# Patient Record
Sex: FEMALE | Race: OTHER | HISPANIC OR LATINO | URBAN - METROPOLITAN AREA
[De-identification: names, ages, dates, MRNs, and addresses within clinical notes are randomized per-mention and may not be internally consistent; named-entity substitution may affect disease eponyms.]

---
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
## 2019-03-09 ENCOUNTER — TRANSCRIPTION ENCOUNTER (OUTPATIENT)
Age: 19
End: 2019-03-09

## 2019-04-26 ENCOUNTER — INPATIENT (INPATIENT)
Facility: HOSPITAL | Age: 19
LOS: 3 days | Discharge: HOME | End: 2019-04-30
Attending: PSYCHIATRY & NEUROLOGY | Admitting: PSYCHIATRY & NEUROLOGY
Payer: COMMERCIAL

## 2019-04-26 VITALS
TEMPERATURE: 100 F | RESPIRATION RATE: 18 BRPM | SYSTOLIC BLOOD PRESSURE: 117 MMHG | DIASTOLIC BLOOD PRESSURE: 73 MMHG | OXYGEN SATURATION: 100 % | WEIGHT: 88.18 LBS | HEART RATE: 96 BPM

## 2019-04-26 DIAGNOSIS — F33.9 MAJOR DEPRESSIVE DISORDER, RECURRENT, UNSPECIFIED: ICD-10-CM

## 2019-04-26 DIAGNOSIS — F41.9 ANXIETY DISORDER, UNSPECIFIED: ICD-10-CM

## 2019-04-26 DIAGNOSIS — F32.9 MAJOR DEPRESSIVE DISORDER, SINGLE EPISODE, UNSPECIFIED: ICD-10-CM

## 2019-04-26 DIAGNOSIS — F12.10 CANNABIS ABUSE, UNCOMPLICATED: ICD-10-CM

## 2019-04-26 DIAGNOSIS — R45.851 SUICIDAL IDEATIONS: ICD-10-CM

## 2019-04-26 DIAGNOSIS — F33.1 MAJOR DEPRESSIVE DISORDER, RECURRENT, MODERATE: ICD-10-CM

## 2019-04-26 LAB
ALBUMIN SERPL ELPH-MCNC: 4.3 G/DL — SIGNIFICANT CHANGE UP (ref 3.5–5.2)
ALP SERPL-CCNC: 49 U/L — SIGNIFICANT CHANGE UP (ref 30–115)
ALT FLD-CCNC: 7 U/L — LOW (ref 14–37)
ANION GAP SERPL CALC-SCNC: 10 MMOL/L — SIGNIFICANT CHANGE UP (ref 7–14)
APAP SERPL-MCNC: <5 UG/ML — LOW (ref 10–30)
AST SERPL-CCNC: 14 U/L — SIGNIFICANT CHANGE UP (ref 14–37)
BASOPHILS # BLD AUTO: 0.05 K/UL — SIGNIFICANT CHANGE UP (ref 0–0.2)
BASOPHILS NFR BLD AUTO: 0.9 % — SIGNIFICANT CHANGE UP (ref 0–1)
BILIRUB SERPL-MCNC: 0.2 MG/DL — SIGNIFICANT CHANGE UP (ref 0.2–1.2)
BUN SERPL-MCNC: 8 MG/DL — LOW (ref 10–20)
CALCIUM SERPL-MCNC: 9 MG/DL — SIGNIFICANT CHANGE UP (ref 8.5–10.1)
CHLORIDE SERPL-SCNC: 102 MMOL/L — SIGNIFICANT CHANGE UP (ref 98–110)
CO2 SERPL-SCNC: 27 MMOL/L — SIGNIFICANT CHANGE UP (ref 17–32)
CREAT SERPL-MCNC: 0.5 MG/DL — LOW (ref 0.7–1.5)
EOSINOPHIL # BLD AUTO: 0.09 K/UL — SIGNIFICANT CHANGE UP (ref 0–0.7)
EOSINOPHIL NFR BLD AUTO: 1.5 % — SIGNIFICANT CHANGE UP (ref 0–8)
ETHANOL SERPL-MCNC: <10 MG/DL — SIGNIFICANT CHANGE UP
GLUCOSE SERPL-MCNC: 79 MG/DL — SIGNIFICANT CHANGE UP (ref 70–99)
HCT VFR BLD CALC: 36.2 % — LOW (ref 37–47)
HGB BLD-MCNC: 12 G/DL — SIGNIFICANT CHANGE UP (ref 12–16)
IMM GRANULOCYTES NFR BLD AUTO: 0.2 % — SIGNIFICANT CHANGE UP (ref 0.1–0.3)
LYMPHOCYTES # BLD AUTO: 2.36 K/UL — SIGNIFICANT CHANGE UP (ref 1.2–3.4)
LYMPHOCYTES # BLD AUTO: 40.5 % — SIGNIFICANT CHANGE UP (ref 20.5–51.1)
MCHC RBC-ENTMCNC: 30 PG — SIGNIFICANT CHANGE UP (ref 27–31)
MCHC RBC-ENTMCNC: 33.1 G/DL — SIGNIFICANT CHANGE UP (ref 32–37)
MCV RBC AUTO: 90.5 FL — SIGNIFICANT CHANGE UP (ref 81–99)
MONOCYTES # BLD AUTO: 0.39 K/UL — SIGNIFICANT CHANGE UP (ref 0.1–0.6)
MONOCYTES NFR BLD AUTO: 6.7 % — SIGNIFICANT CHANGE UP (ref 1.7–9.3)
NEUTROPHILS # BLD AUTO: 2.92 K/UL — SIGNIFICANT CHANGE UP (ref 1.4–6.5)
NEUTROPHILS NFR BLD AUTO: 50.2 % — SIGNIFICANT CHANGE UP (ref 42.2–75.2)
NRBC # BLD: 0 /100 WBCS — SIGNIFICANT CHANGE UP (ref 0–0)
PLATELET # BLD AUTO: 214 K/UL — SIGNIFICANT CHANGE UP (ref 130–400)
POTASSIUM SERPL-MCNC: 4.1 MMOL/L — SIGNIFICANT CHANGE UP (ref 3.5–5)
POTASSIUM SERPL-SCNC: 4.1 MMOL/L — SIGNIFICANT CHANGE UP (ref 3.5–5)
PROT SERPL-MCNC: 6.8 G/DL — SIGNIFICANT CHANGE UP (ref 6–8)
RBC # BLD: 4 M/UL — LOW (ref 4.2–5.4)
RBC # FLD: 12 % — SIGNIFICANT CHANGE UP (ref 11.5–14.5)
SALICYLATES SERPL-MCNC: <0.3 MG/DL — LOW (ref 4–30)
SODIUM SERPL-SCNC: 139 MMOL/L — SIGNIFICANT CHANGE UP (ref 135–146)
WBC # BLD: 5.82 K/UL — SIGNIFICANT CHANGE UP (ref 4.8–10.8)
WBC # FLD AUTO: 5.82 K/UL — SIGNIFICANT CHANGE UP (ref 4.8–10.8)

## 2019-04-26 PROCEDURE — 99285 EMERGENCY DEPT VISIT HI MDM: CPT | Mod: 25

## 2019-04-26 PROCEDURE — 93010 ELECTROCARDIOGRAM REPORT: CPT

## 2019-04-26 PROCEDURE — 90792 PSYCH DIAG EVAL W/MED SRVCS: CPT | Mod: GT

## 2019-04-26 RX ORDER — MIRTAZAPINE 45 MG/1
15 TABLET, ORALLY DISINTEGRATING ORAL AT BEDTIME
Qty: 0 | Refills: 0 | Status: DISCONTINUED | OUTPATIENT
Start: 2019-04-26 | End: 2019-04-30

## 2019-04-26 RX ORDER — NICOTINE POLACRILEX 2 MG
2 GUM BUCCAL
Qty: 0 | Refills: 0 | Status: DISCONTINUED | OUTPATIENT
Start: 2019-04-26 | End: 2019-04-30

## 2019-04-26 RX ORDER — SERTRALINE 25 MG/1
50 TABLET, FILM COATED ORAL DAILY
Qty: 0 | Refills: 0 | Status: DISCONTINUED | OUTPATIENT
Start: 2019-04-26 | End: 2019-04-26

## 2019-04-26 RX ORDER — ACETAMINOPHEN 500 MG
650 TABLET ORAL EVERY 6 HOURS
Qty: 0 | Refills: 0 | Status: DISCONTINUED | OUTPATIENT
Start: 2019-04-26 | End: 2019-04-30

## 2019-04-26 RX ORDER — HYDROXYZINE HCL 10 MG
25 TABLET ORAL EVERY 8 HOURS
Qty: 0 | Refills: 0 | Status: DISCONTINUED | OUTPATIENT
Start: 2019-04-26 | End: 2019-04-30

## 2019-04-26 RX ADMIN — SERTRALINE 50 MILLIGRAM(S): 25 TABLET, FILM COATED ORAL at 09:42

## 2019-04-26 RX ADMIN — MIRTAZAPINE 15 MILLIGRAM(S): 45 TABLET, ORALLY DISINTEGRATING ORAL at 20:38

## 2019-04-26 NOTE — ED BEHAVIORAL HEALTH ASSESSMENT NOTE - DESCRIPTION
Denies since parents  10 years ago, she has lived with her mother and older brother, the latter lives separately now with his own family. ED Course:    Patient in ED for ~ 30 minutes prior to Telepsych consult which was requested at 1:04. Per resident, Dr. Modi patient arrived at ~0:33 dressed appropriately for the weather, with good hygiene/grooming, and accompanied by her cousin. Resident states that upon arrival patient reports she has been feeling depressed and anxious with suicidal ideations; but denies plan and intent. Resident states that upon arrival patient was compliant with the triage/interview process, willingly changed into a hospital gown, allowed security to wand her and collect her belongings without incident.  Nothing of note in patient’s belongings. Resident states that because of patients age, blood work and a urinalysis has not been ordered. However, telepsych attending requested blood work and a urinalysis shortly after assessment. Patient’s speech is at a steady/normal rate and is clear/audible with a linear thought content and good eye content. Per resident,  patient appears euthymic with a mood congruent affect. Per resident, patient denies AH/VH, delusions is A/OX3 and does not appear cognitively impaired. Patient has not slept or eaten anything as of yet. Resident states that patient has been calm/cooperative in the ED, no agitation, aggression or behavioral issues. Patient is engaging with staff appropriately. Patient has not required additional psychiatric medications or security interventions. Patient was placed on 1:1 observation and has been interacting appropriately with the 1:1. Patient placed in private room for telepsychiatry interview that began at 1:19. No additional complaints at this time.

## 2019-04-26 NOTE — ED PROVIDER NOTE - OBJECTIVE STATEMENT
18yo f pmhx anxiety, depression, suicidal ideation presents CC  anxiety, depression, states it would just be easier if she wasn't here anymore. pt denies having a plan to end her life. no homicidal ideation. pt denies ingestion pta. 1:1 placed upon arrival, tele psych consulted, spoke to dr pulido. reports occasional xanax use, last used 2 months ago. reports occasional marijuana use but states not recently.

## 2019-04-26 NOTE — ED BEHAVIORAL HEALTH ASSESSMENT NOTE - OTHER PAST PSYCHIATRIC HISTORY (INCLUDE DETAILS REGARDING ONSET, COURSE OF ILLNESS, INPATIENT/OUTPATIENT TREATMENT)
1 prior IPP admission in 03/2018. subsequent PHP.   She had a suicide attempt by hanging in 2017, and a recent suicide attempt of overdosing on xanax+ alcohol in 03/2018 which resulting in her going to the [facility] ED, from which she was discharged.  Patient self stopped taking Remeron and seeing outpatient provider 2-3 months after last discharged from [facility]

## 2019-04-26 NOTE — H&P ADULT - ASSESSMENT
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     Patient is seen and evaluated using telehealth device, she is calm and cooperative, states that she has been feeling overwhelmed with worsen depressed mood, anxiety, and frequent passive SI for the past 1 month.  she states that she has been struggling at school, recently broke up with girlfriend 2 weeks ago, and her appetite was decreased since 1 month ago, feels nauseous whenever she eats.  She reports feeling depressed and anxious on most days with poor sleep, energy, feeling worthless and hopeless, with passive SI almost every other day, but denied active SI/plan/intent.  She states she has lost 7 lbs since last month, continues to have low motivation, unable to engaged in school or social agenda.  She denied HI/AVH at this time.  She shares that since her psych admission last March, she completed PHP and was prescribed with Remeron and was supposed to follow up with psychiatrist and therapist.  However patient admits to stopped taking Remeron 2-3 months after she was discharged as she started to feel better and did not followup with outpatient providers as "they asked too many questions." and she verbalized wanting to "just talk about my problems and vent."  She also started drinking Ensure as she has not been eating, above was recommended last year during her psych admission for decreased po intake due to depression.  Patient agreed with voluntary psych admission at this time for stabilization.

## 2019-04-26 NOTE — ED BEHAVIORAL HEALTH ASSESSMENT NOTE - RISK ASSESSMENT
Acute Suicide Risk  ( X ) High   (  ) Moderate   (  ) Low   (  ) Unable to determine   Rationale:  see assessment for details.      Elevated Chronic Risk   ( X ) Yes.  Due to substance use, psychiatric history, psychological factors, h/o SA, noncompliance   Details ___________  (  ) No   ___________     Safety Plan   Details: ___________  [ X ] Safety plan discussed with patient.  Inform staff/doctor when endorsing SI/HI/AVH, currently feels safe in hospital environment   [ X ] Education provided regarding environmental safety / lethal means restriction  [  ] Provision of National Suicide Prevention Lifeline 3-724-491-XSAD (1870)

## 2019-04-26 NOTE — ED BEHAVIORAL HEALTH ASSESSMENT NOTE - SUICIDE PROTECTIVE FACTORS
Engaged in work or school/Responsibility to family and others/Supportive social network or family/Identifies reasons for living/Future oriented/Positive therapeutic relationships

## 2019-04-26 NOTE — PATIENT PROFILE BEHAVIORAL HEALTH - PMH
Recurrent major depressive disorder, remission status unspecified  plan as per psychiatrist   medication as per PMD

## 2019-04-26 NOTE — ED BEHAVIORAL HEALTH ASSESSMENT NOTE - SUMMARY
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 Patient has substantial neurovegetative symptoms including poor sleep, appetite, weight loss, poor energy, low motivation, anhedonia, increased anxiety, frequent passive SI, feeling hopeless, and worthless with acute stressors of recent breakup and stressful academic affairs.  Patient is currently at elevated risk given above plus h/o SI, SA, impulsive behavior, substance use, treatment noncompliance, meeting criteria for inpatient admission for stabilization.

## 2019-04-26 NOTE — CHART NOTE - NSCHARTNOTEFT_GEN_A_CORE
Social Work Admit Note:    Patient is 19 years of age female who was admitted for evaluation of worsening depression and suicidal ideation.  She presented to the hospital with her cousin.  At time of assessment in the emergency department she endorsed depressed mood, anxiety, and for a period of one month passive and frequent suicidal ideation.  Stressors identified as recent breakup with girlfriend and school.  Decreased appetite, poor sleep and feeling worthless and hopeless also endorsed.      In the community patient resides with her mother and brother in a private residence in [place].  She is single and an undergraduate student.  Family history of substance use with her father who was incarcerated when patient was born until she was [age] years of age.  Patient’s parents are  from ten years ago.      Patient has history of one prior inpatient psychiatric admission in March of 2018.  She also has history suicide attempts by hanging in 2017 and overdose of alcohol and xanax in 2018.  Patient has [facility] and then outpatient mental health.  She discontinued medication and outpatient treatment as she was feeling better.          will continue to meet with patient 1:1 and with treatment team daily.  Discharge plan is for referral for continued treatment in outpatient setting.     Please refer to Social Work Psychosocial for additional information.

## 2019-04-26 NOTE — PROGRESS NOTE BEHAVIORAL HEALTH - NSBHADDHXPSYCHFT_PSY_A_CORE
1 prior IPP admission in 03/2018. subsequent PHP.   She had a suicide attempt by hanging in 2017, and a suicide attempt of overdosing on xanax+ alcohol in 03/2018 which resulting in her going to the [facility] ED, from which she was discharged.  Patient self stopped taking Remeron and seeing outpatient provider 2-3 months after last discharged from [facility]

## 2019-04-26 NOTE — ED BEHAVIORAL HEALTH ASSESSMENT NOTE - DETAILS
she had a suicide attempt by hanging in 2017, and a recent suicide attempt of overdosing on xanax in 03/2018 which resulting in her going to the [facility] ED, from which she was discharged. father used substance and was in [employer] for two years when pt was born tired nausea handoff provided to inpatient psychiatrist Patient and ED notified

## 2019-04-26 NOTE — ED BEHAVIORAL HEALTH ASSESSMENT NOTE - HPI (INCLUDE ILLNESS QUALITY, SEVERITY, DURATION, TIMING, CONTEXT, MODIFYING FACTORS, ASSOCIATED SIGNS AND SYMPTOMS)
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 in March 2019 and PHP subsequently, with xanax use disorder (in remission), marijuana use disorder, 2 prior SA/gestures by hanging in 2017 and xanax overdose in March 2018, not currently on any psychiatric medication or outpatient treatment, presented with cousin tonight for depression and SI.     Patient is seen and evaluated using telehealth device, she is calm and cooperative, states that she has been feeling overwhelmed with worsen depressed mood, anxiety, and frequent passive SI for the past 1 month.  she states that she has been struggling at school, recently broke up with girlfriend 2 weeks ago, and her appetite was decreased since 1 month ago, feels nauseous whenever she eats.  She reports feeling depressed and anxious on most days with poor sleep, energy, feeling worthless and hopeless, with passive SI almost every other day, but denied active SI/plan/intent.  She states she has lost 7 lbs since last month, continues to have low motivation, unable to engaged in school or social agenda.  She denied HI/AVH at this time.  She shares that since her psych admission last March, she completed PHP and was prescribed with Remeron and was supposed to follow up with psychiatrist and therapist.  However patient admits to stopped taking Remeron 2-3 months after she was discharged as she started to feel better and did not followup with outpatient providers as "they asked too many questions." and she verbalized wanting to "just talk about my problems and vent."  She also started drinking Ensure as she has not been eating, above was recommended last year during her psych admission for decreased po intake due to depression.  Patient agreed with voluntary psych admission at this time for stabilization.    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     Patient is seen and evaluated using telehealth device, she is calm and cooperative, states that she has been feeling overwhelmed with worsen depressed mood, anxiety, and frequent passive SI for the past 1 month.  she states that she has been struggling at school, recently broke up with girlfriend 2 weeks ago, and her appetite was decreased since 1 month ago, feels nauseous whenever she eats.  She reports feeling depressed and anxious on most days with poor sleep, energy, feeling worthless and hopeless, with passive SI almost every other day, but denied active SI/plan/intent.  She states she has lost 7 lbs since last month, continues to have low motivation, unable to engaged in school or social agenda.  She denied HI/AVH at this time.  She shares that since her psych admission last March, she completed PHP and was prescribed with Remeron and was supposed to follow up with psychiatrist and therapist.  However patient admits to stopped taking Remeron 2-3 months after she was discharged as she started to feel better and did not followup with outpatient providers as "they asked too many questions." and she verbalized wanting to "just talk about my problems and vent."  She also started drinking Ensure as she has not been eating, above was recommended last year during her psych admission for decreased po intake due to depression.  Patient agreed with voluntary psych admission at this time for stabilization.    Collateral information is obtained by [facility]:  Collateral provided by cousin Abebe Javier [phone], daily contact and reliability is high. Per cousin the HPI starts yesterday evening. Prior to that patient was functioning at her baseline which consisted of eating about 2-3 meals per day, completing her IADL’s, obtaining about 7-8 hours of sleep, socializing with peers, attending [employer] 3-4 times per week and goes to work at [employer] about 3-4 times week. Baseline symptoms include mild anxiety, euthymic mood and clear/linear thoughts. Patient is not prescribed medications and does not have outpatient mental health treatment.     Per cousin, patient was hospitalized at [facility] last March for depression with suicidal ideations. Cousin states that patient was discharged with the recommendation to follow up at [facility]. Per cousin patient went to treatment for a few weeks and stopped going because she felt better. Cousin reports since her discharge in March (2018) patient has been functioning at her baseline. Cousin reports, patient broke up with her girlfriend about one month ago. He states the relationship was somewhat “toxic” and “volatile”.  Per Cousin, since the breakup patient has been decompensating. Cousin states, yesterday evening patient had a breaking point and verbalized suicidal ideations with a plan to jump off of a bridge. At that point, cousin decided to bring patient to the ED.    Patients decompensation trigger is identified as a recent break-up resulting in a depressed mood with suicidal ideations and increased anxiety. Patients decompensation functioning consists of sleeping less; about 3-4 hours per night, supplementing her meals with 2-3 cans of Ensure a day; per cousin patient lost about 7 pounds over the last 30 days and is isolating in the home. Though she has been depressed, cousin reports patient has been going to work and attending school regularly. Patients decompensation symptoms consists of bouts of crying episodes, a depressed mood, increased anxiety and a distractible thought content.     Per cousin, patient was diagnosed with Depression a few years ago. She has a history of THC and Xanax abuse. Per cousin, patient has no legal, , trauma or abuse history. There is a family history of Bipolar disorder; but no substance abuse. There is no family history of completed suicide or attempts. Patient has no history of violence or self-injurious behaviors. Patient had a normal developmental pattern and no medical issues. She has no access to weapons, guns or knives. Cousin has significant safety concerns and believes patient would benefit from a psychiatric hospitalization. When patient is discharged from the hospital, cousin is able to provide care/support to ensure patient is compliant with recommended treatment.

## 2019-04-26 NOTE — ED BEHAVIORAL HEALTH ASSESSMENT NOTE - PSYCHIATRIC ISSUES AND PLAN (INCLUDE STANDING AND PRN MEDICATION)
Start Remeron 7.5mg po qhs, trazodone 50mg qhs PRN for insomnia, Haldol 2mg/Benadryl 50mg q6H prn for agitation

## 2019-04-26 NOTE — H&P ADULT - NSICDXPASTMEDICALHX_GEN_ALL_CORE_FT
PAST MEDICAL HISTORY:  Recurrent major depressive disorder, remission status unspecified plan as per psychiatrist   medication as per PMD

## 2019-04-26 NOTE — PROGRESS NOTE BEHAVIORAL HEALTH - NSBHADDHXSUBSTFT_PSY_A_CORE
Reports daily marijuana use of 1-3 blunts per day. States she has a history of using Xanax, last use 2 weeks ago.   Denied other drug/alcohol use.
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
## 2019-04-26 NOTE — ED PROVIDER NOTE - PHYSICAL EXAMINATION
CONSTITUTIONAL: Well-developed; well-nourished; in no acute distress, speaking in full sentences  SKIN: warm, dry  HEAD: Normocephalic; atraumatic  EYES: PERRL, EOMI, no conjunctival erythema  ENT: No nasal discharge; airway clear, mucous membranes moist  NECK: Supple; non tender, FROM  CARD: +S1, S2 no murmurs, gallops, or rubs. Regular rate and rhythm. radial 2+  RESP: No wheezes, rales or rhonchi. CTABL  EXT: moves all extremities,  No clubbing, cyanosis or edema.   NEURO: Alert, oriented, grossly unremarkable, no focal deficits, cn ii-xii grossly intact  PSYCH: Cooperative, appropriate, denies homicidal ideation.

## 2019-04-26 NOTE — ED PROVIDER NOTE - ATTENDING CONTRIBUTION TO CARE
I personally evaluated the patient. I reviewed the Resident’s or Physician Assistant’s note (as assigned above), and agree with the findings and plan except as documented in my note.  19 yr F, hx of anxiety and depression, non complaint with her medical treatment presents with worsened depression. Denies active suicidal planning. No medical complaints except from chronic lack of appetite. Denies any fever, coughing, SOB. Pt reports using vape and smoking marjuana, occasional recreational Xanax use. VS reviewed, pt well appearing, NAD. Head ncat, pharyngeal exam w/o erythema, edema or exudates. B/l TM wnl. normal s1s2 without any murmurs, Lungs CTAB with normal work of breathing. abd +BS, s/nd/nt, extremities wnl, neuro exam grossly normal. No acute skin rashes. [facility]

## 2019-04-26 NOTE — PROGRESS NOTE BEHAVIORAL HEALTH - NSBHCHARTREVIEWLAB_PSY_A_CORE FT
Comprehensive Metabolic Panel (04.26.19 @ 02:07)    Sodium, Serum: 139 mmol/L    Potassium, Serum: 4.1 mmol/L    Chloride, Serum: 102 mmol/L    Carbon Dioxide, Serum: 27 mmol/L    Anion Gap, Serum: 10 mmol/L    Blood Urea Nitrogen, Serum: 8 mg/dL    Creatinine, Serum: 0.5 mg/dL    Glucose, Serum: 79 mg/dL    Calcium, Total Serum: 9.0 mg/dL    Protein Total, Serum: 6.8 g/dL    Albumin, Serum: 4.3 g/dL    Bilirubin Total, Serum: 0.2 mg/dL    Alkaline Phosphatase, Serum: 49 U/L    Aspartate Aminotransferase (AST/SGOT): 14 U/L    Alanine Aminotransferase (ALT/SGPT): 7 U/L    eGFR if Non : 139: Interpretative comment    Complete Blood Count + Automated Diff (04.26.19 @ 02:07)    WBC Count: 5.82 K/uL    RBC Count: 4.00 M/uL    Hemoglobin: 12.0 g/dL    Hematocrit: 36.2 %    Mean Cell Volume: 90.5 fL    Mean Cell Hemoglobin: 30.0 pg    Mean Cell Hemoglobin Conc: 33.1 g/dL    Red Cell Distrib Width: 12.0 %    Platelet Count - Automated: 214 K/uL    Auto Neutrophil #: 2.92 K/uL    Auto Lymphocyte #: 2.36 K/uL    Auto Monocyte #: 0.39 K/uL    Auto Eosinophil #: 0.09 K/uL    Auto Basophil #: 0.05 K/uL    Auto Neutrophil %: 50.2: Differential percentages must be correlated with absolute numbers for  clinical significance. %    Auto Lymphocyte %: 40.5 %    Auto Monocyte %: 6.7 %    Auto Eosinophil %: 1.5 %    Auto Basophil %: 0.9 %    Auto Immature Granulocyte %: 0.2 %    Nucleated RBC: 0 /100 WBCs

## 2019-04-26 NOTE — ED PROVIDER NOTE - CLINICAL SUMMARY MEDICAL DECISION MAKING FREE TEXT BOX
Pt with hx of depression presents with worsened depression. Admitted to psych for further management.

## 2019-04-26 NOTE — ED PROVIDER NOTE - NS ED ROS FT
Constitutional: (-) fever (-) vomiting  Eyes/ENT: (-) blurry vision, (-) epistaxis  Cardiovascular: (-) chest pain, (-) syncope  Respiratory: (-) cough, (-) shortness of breath  Gastrointestinal: (-) vomiting, (-) diarrhea, (-) abdominal pain  : (-) dysuria   Musculoskeletal: (-) neck pain, (-) back pain, (-) joint pain  Integumentary: (-) rash, (-) edema  Neurological: (-) headache, (-)loc  Allergic/Immunologic: (-) pruritus  Endocrine: No history of thyroid disease or diabetes.

## 2019-04-26 NOTE — ED BEHAVIORAL HEALTH ASSESSMENT NOTE - DESCRIPTION (FIRST USE, LAST USE, QUANTITY, FREQUENCY, DURATION)
social alcohol use none since d/c from [facility] daily use 1-3 blunts patient has h/o Xanax abuse but denied current use

## 2019-04-26 NOTE — ED PEDIATRIC TRIAGE NOTE - CHIEF COMPLAINT QUOTE
Pt came c/o thoughts of suicide, c/o depression, not eating and drinking well for the past 2 months, lost about 7 pounds

## 2019-04-26 NOTE — CONSULT NOTE ADULT - SUBJECTIVE AND OBJECTIVE BOX
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     Patient is seen and evaluated using telehealth device, she is calm and cooperative, states that she has been feeling overwhelmed with worsen depressed mood, anxiety, and frequent passive SI for the past 1 month.  she states that she has been struggling at school, recently broke up with girlfriend 2 weeks ago, and her appetite was decreased since 1 month ago, feels nauseous whenever she eats.  She reports feeling depressed and anxious on most days with poor sleep, energy, feeling worthless and hopeless, with passive SI almost every other day, but denied active SI/plan/intent.  She states she has lost 7 lbs since last month, continues to have low motivation, unable to engaged in school or social agenda.  She denied HI/AVH at this time.  She shares that since her psych admission last March, she completed PHP and was prescribed with Remeron and was supposed to follow up with psychiatrist and therapist.  However patient admits to stopped taking Remeron 2-3 months after she was discharged as she started to feel better and did not followup with outpatient providers as "they asked too many questions." and she verbalized wanting to "just talk about my problems and vent."  She also started drinking Ensure as she has not been eating, above was recommended last year during her psych admission for decreased po intake due to depression.  Patient agreed with voluntary psych admission at this time for stabilization.    Collateral information is obtained by [facility]:  Collateral provided by coumacy Abebe Javier [phone], daily contact and reliability is high. Per cousin the HPI starts yesterday evening. Prior to that patient was functioning at her baseline which consisted of eating about 2-3 meals per day, completing her IADL’s, obtaining about 7-8 hours of sleep, socializing with peers, attending [employer] 3-4 times per week and goes to work at [employer] about 3-4 times week. Baseline symptoms include mild anxiety, euthymic mood and clear/linear thoughts. Patient is not prescribed medications and does not have outpatient mental health treatment. (26 Apr 2019 06:34)    INTERVAL HPI/OVERNIGHT EVENTS:  HEALTH ISSUES - PROBLEM Dx:  Anxiety: Anxiety  Depression: Depression  Suicidal ideation: Suicidal ideation  Cannabis abuse  Major depressive disorder, recurrent episode with anxious distress        PAST MEDICAL & SURGICAL HISTORY:  Recurrent major depressive disorder, remission status unspecified: plan as per psychiatrist   medication as per PMD  No significant past surgical history    FAMILY HISTORY:    acetaminophen   Tablet .. 650 milliGRAM(s) Oral every 6 hours PRN  aluminum hydroxide/magnesium hydroxide/simethicone Suspension 30 milliLiter(s) Oral every 4 hours PRN  nicotine  Polacrilex Gum 2 milliGRAM(s) Oral every 2 hours PRN  sertraline 50 milliGRAM(s) Oral daily      REVIEW OF SYSTEMS:  CONSTITUTIONAL: No fever, weight loss, or fatigue  EYES: No eye pain, visual disturbances, or discharge  ENMT:  No difficulty hearing, tinnitus, vertigo; No sinus or throat pain  NECK: No pain or stiffness  BREASTS: No pain, masses, or nipple discharge  RESPIRATORY: No cough, wheezing, chills or hemoptysis; No shortness of breath  CARDIOVASCULAR: No chest pain, palpitations, dizziness, or leg swelling  GASTROINTESTINAL: No abdominal or epigastric pain. No nausea, vomiting, or hematemesis; No diarrhea or constipation. No melena or hematochezia.  GENITOURINARY: No dysuria, frequency, hematuria, or incontinence  NEUROLOGICAL: No headaches, memory loss, loss of strength, numbness, or tremors  SKIN: No itching, burning, rashes, or lesions   LYMPH NODES: No enlarged glands  ENDOCRINE: No heat or cold intolerance; No hair loss  MUSCULOSKELETAL: No joint pain or swelling; No muscle, back, or extremity pain  PSYCHIATRIC: as per hpi and previous psych history  HEME/LYMPH: No easy bruising, or bleeding gums  ALLERY AND IMMUNOLOGIC: No hives or eczema    T(C): 36.1 (04-26-19 @ 09:31), Max: 37.6 (04-26-19 @ 00:47)  HR: 61 (04-26-19 @ 09:31) (61 - 96)  BP: 86/49 (04-26-19 @ 09:31) (86/49 - 117/73)  RR: 18 (04-26-19 @ 09:31) (16 - 18)  SpO2: 100% (04-26-19 @ 00:47) (100% - 100%)  Wt(kg): --Vital Signs Last 24 Hrs  T(C): 36.1 (26 Apr 2019 09:31), Max: 37.6 (26 Apr 2019 00:47)  T(F): 96.9 (26 Apr 2019 09:31), Max: 99.6 (26 Apr 2019 00:47)  HR: 61 (26 Apr 2019 09:31) (61 - 96)  BP: 86/49 (26 Apr 2019 09:31) (86/49 - 117/73)  BP(mean): --  RR: 18 (26 Apr 2019 09:31) (16 - 18)  SpO2: 100% (26 Apr 2019 00:47) (100% - 100%)    PHYSICAL EXAM:  GENERAL: NAD,well-developed  HEAD:  Atraumatic, Normocephalic  EYES: EOMI, PERRLA, conjunctiva and sclera clear  ENMT: No tonsillar erythema, exudates, or enlargement; Moist mucous membranes, Good dentition, No lesions  NECK: Supple, No JVD, Normal thyroid  CHEST/LUNG: Clear bs bilaterally; No rales, rhonchi, wheezing  HEART: Regular rate and rhythm; No murmurs, rubs, or gallops  ABDOMEN: Soft, Nontender, Nondistended; Bowel sounds present  EXTREMITIES:  2+ Peripheral Pulses, No clubbing, cyanosis, or edema  LYMPH: No lymphadenopathy noted  SKIN: No rashes or lesions  Neuro: alert  no focal deficits    Consultant(s) Notes Reviewed:  [x ] YES  [ ] NO  Care Discussed with Consultants/Other Providers [ x] YES  [ ] NO    LABS:                        12.0   5.82  )-----------( 214      ( 26 Apr 2019 02:07 )             36.2     04-26    139  |  102  |  8<L>  ----------------------------<  79  4.1   |  27  |  0.5<L>    Ca    9.0      26 Apr 2019 02:07    TPro  6.8  /  Alb  4.3  /  TBili  0.2  /  DBili  x   /  AST  14  /  ALT  7<L>  /  AlkPhos  49  04-26        CAPILLARY BLOOD GLUCOSE                RADIOLOGY & ADDITIONAL TESTS:    Imaging Personally Reviewed:  [ ] YES  [ ] NO

## 2019-04-26 NOTE — ED PROVIDER NOTE - PROGRESS NOTE DETAILS
dr bolaños discussed case with dr. campbell from tele psych, will order labs and admit to [facility] psych. dr bolaños discussed case with dr. campbell from tele psych, will order labs and [facility] to [facility]. [facility] voluntary admission form completed, faxed to tele psych, placed in chart ZW

## 2019-04-26 NOTE — PATIENT PROFILE BEHAVIORAL HEALTH - NS SC CAGE ALCOHOL GUILTY ABOUT
Pt with symptoms consistent w/ CHF exacerbation including progressive SOB/RODRIGUEZ/ b/l LE swelling,  (lower d/t obesity), although CXR not showing any pleural effusions, just cardiomegaly/increased lung markings. CTAngio showing unchanged large pericardial effusion as possible worsening cause of HF. Trigger is multifactorial including medication/diet noncompliance. Less likely infection being that no signs/symptoms of infection & afebrile, no leukocytosis, or Ischemia being that no sx of chest pain, Trops neg2x; Last cath in 2016 no evidence of ischemia. Pt on Home [id number], Norvasc2.5q, [id number], Atorvastatin 80qd )  -Diuresis: IV Lasix 80 2x given; Lasix AM given; c/w monitoring   -C/w [id number], Norvasc2.5 q, Atorvastatin 80qd   -Echo   -Daily weight & strict I/Os no

## 2019-04-26 NOTE — H&P ADULT - NSHPLABSRESULTS_GEN_ALL_CORE
Vital Signs Last 24 Hrs  T(C): 35.4 (26 Apr 2019 06:30), Max: 37.6 (26 Apr 2019 00:47)  T(F): 95.8 (26 Apr 2019 06:30), Max: 99.6 (26 Apr 2019 00:47)  HR: 70 (26 Apr 2019 06:30) (70 - 96)  BP: 109/58 (26 Apr 2019 06:30) (100/50 - 117/73)  BP(mean): --  RR: 16 (26 Apr 2019 06:30) (16 - 18)  SpO2: 100% (26 Apr 2019 00:47) (100% - 100%)                        12.0   5.82  )-----------( 214      ( 26 Apr 2019 02:07 )             36.2       04-26    139  |  102  |  8<L>  ----------------------------<  79  4.1   |  27  |  0.5<L>    Ca    9.0      26 Apr 2019 02:07    TPro  6.8  /  Alb  4.3  /  TBili  0.2  /  DBili  x   /  AST  14  /  ALT  7<L>  /  AlkPhos  49  04-26                      Lactate Trend            CAPILLARY BLOOD GLUCOSE

## 2019-04-27 LAB
CHOLEST SERPL-MCNC: 104 MG/DL — SIGNIFICANT CHANGE UP (ref 95–200)
ESTIMATED AVERAGE GLUCOSE: 97 MG/DL — SIGNIFICANT CHANGE UP (ref 68–114)
HBA1C BLD-MCNC: 5 % — SIGNIFICANT CHANGE UP (ref 4–5.6)
HDLC SERPL-MCNC: 51 MG/DL — SIGNIFICANT CHANGE UP
LIPID PNL WITH DIRECT LDL SERPL: 54 MG/DL — SIGNIFICANT CHANGE UP (ref 4–129)
TOTAL CHOLESTEROL/HDL RATIO MEASUREMENT: 2 RATIO — LOW (ref 4–5.5)
TRIGL SERPL-MCNC: 60 MG/DL — SIGNIFICANT CHANGE UP (ref 10–149)
TSH SERPL-MCNC: 0.5 UIU/ML — SIGNIFICANT CHANGE UP (ref 0.27–4.2)

## 2019-04-27 RX ADMIN — MIRTAZAPINE 15 MILLIGRAM(S): 45 TABLET, ORALLY DISINTEGRATING ORAL at 20:40

## 2019-04-27 RX ADMIN — Medication 25 MILLIGRAM(S): at 20:40

## 2019-04-28 LAB — HCG UR QL: NEGATIVE — SIGNIFICANT CHANGE UP

## 2019-04-28 RX ADMIN — Medication 25 MILLIGRAM(S): at 20:37

## 2019-04-28 RX ADMIN — MIRTAZAPINE 15 MILLIGRAM(S): 45 TABLET, ORALLY DISINTEGRATING ORAL at 20:37

## 2019-04-28 NOTE — PROGRESS NOTE BEHAVIORAL HEALTH - RISK ASSESSMENT
Acute Suicide Risk  ( X ) High   (  ) Moderate   (  ) Low   (  ) Unable to determine   Rationale:  see assessment for details.      Elevated Chronic Risk   ( X ) Yes.  Due to substance use, psychiatric history, psychological factors, h/o SA, noncompliance   Details ___________  (  ) No   ___________     Safety Plan   Details: ___________  [ X ] Safety plan discussed with patient.  Inform staff/doctor when endorsing SI/HI/AVH, currently feels safe in hospital environment   [ X ] Education provided regarding environmental safety / lethal means restriction
Acute Suicide Risk  ( X ) High   (  ) Moderate   (  ) Low   (  ) Unable to determine   Rationale:  see assessment for details.      Elevated Chronic Risk   ( X ) Yes.  Due to substance use, psychiatric history, psychological factors, h/o SA, noncompliance   Details ___________  (  ) No   ___________     Safety Plan   Details: ___________  [ X ] Safety plan discussed with patient.  Inform staff/doctor when endorsing SI/HI/AVH, currently feels safe in hospital environment   [ X ] Education provided regarding environmental safety / lethal means restriction

## 2019-04-29 LAB
AMPHET UR-MCNC: NEGATIVE — SIGNIFICANT CHANGE UP
BARBITURATES UR SCN-MCNC: NEGATIVE — SIGNIFICANT CHANGE UP
BENZODIAZ UR-MCNC: NEGATIVE — SIGNIFICANT CHANGE UP
COCAINE METAB.OTHER UR-MCNC: NEGATIVE — SIGNIFICANT CHANGE UP
DRUG SCREEN 1, URINE RESULT: SIGNIFICANT CHANGE UP
METHADONE UR-MCNC: NEGATIVE — SIGNIFICANT CHANGE UP
OPIATES UR-MCNC: NEGATIVE — SIGNIFICANT CHANGE UP
PCP UR-MCNC: NEGATIVE — SIGNIFICANT CHANGE UP
PROPOXYPHENE QUALITATIVE URINE RESULT: NEGATIVE — SIGNIFICANT CHANGE UP
THC UR QL: POSITIVE

## 2019-04-29 RX ORDER — MIRTAZAPINE 45 MG/1
1 TABLET, ORALLY DISINTEGRATING ORAL
Qty: 14 | Refills: 0 | OUTPATIENT
Start: 2019-04-29 | End: 2019-05-12

## 2019-04-29 RX ADMIN — MIRTAZAPINE 15 MILLIGRAM(S): 45 TABLET, ORALLY DISINTEGRATING ORAL at 20:27

## 2019-04-29 RX ADMIN — Medication 25 MILLIGRAM(S): at 20:32

## 2019-04-29 NOTE — PROGRESS NOTE BEHAVIORAL HEALTH - SUMMARY
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 Pt meets criteria for MDD, recurrent, moderate.
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 Pt meets criteria for MDD, recurrent, moderate.
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 Patient has substantial neurovegetative symptoms including poor sleep, appetite, weight loss, poor energy, low motivation, anhedonia, increased anxiety, frequent passive SI, feeling hopeless, and worthless with acute stressors of recent breakup and stressful academic affairs.  Patient is currently at elevated risk given above plus h/o SI, SA, impulsive behavior, substance use, treatment noncompliance, meeting criteria for inpatient admission for stabilization.
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 Patient has substantial neurovegetative symptoms including poor sleep, appetite, weight loss, poor energy, low motivation, anhedonia, increased anxiety, frequent passive SI, feeling hopeless, and worthless with acute stressors of recent breakup and stressful academic affairs.  Patient is currently at elevated risk given above plus h/o SI, SA, impulsive behavior, substance use, treatment noncompliance, meeting criteria for inpatient admission for stabilization.

## 2019-04-29 NOTE — DISCHARGE NOTE BEHAVIORAL HEALTH - FAMILY HISTORY OF PSYCHIATRIC ILLNESS
Pt lives with mother and older brother, is a student at [employer].  Father- History of substance use   Mother's side of the family has history of bipolar d/o.

## 2019-04-29 NOTE — DISCHARGE NOTE BEHAVIORAL HEALTH - HPI (INCLUDE ILLNESS QUALITY, SEVERITY, DURATION, TIMING, CONTEXT, MODIFYING FACTORS, ASSOCIATED SIGNS AND SYMPTOMS)
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     Patient is seen and evaluated using telehealth device, she is calm and cooperative, states that she has been feeling overwhelmed with worsen depressed mood, anxiety, and frequent passive SI for the past 1 month.  she states that she has been struggling at school, recently broke up with girlfriend 2 weeks ago, and her appetite was decreased since 1 month ago, feels nauseous whenever she eats.  She reports feeling depressed and anxious on most days with poor sleep, energy, feeling worthless and hopeless, with passive SI almost every other day, but denied active SI/plan/intent.  She states she has lost 7 lbs since last month, continues to have low motivation, unable to engaged in school or social agenda.  She denied HI/AVH at this time.  She shares that since her psych admission last March, she completed PHP and was prescribed with Remeron and was supposed to follow up with psychiatrist and therapist.  However patient admits to stopped taking Remeron 2-3 months after she was discharged as she started to feel better and did not followup with outpatient providers as "they asked too many questions." and she verbalized wanting to "just talk about my problems and vent."  She also started drinking Ensure as she has not been eating, above was recommended last year during her psych admission for decreased po intake due to depression.  Patient agreed with voluntary psych admission at this time for stabilization.

## 2019-04-29 NOTE — DISCHARGE NOTE BEHAVIORAL HEALTH - CONDITIONS AT DISCHARGE
Pt is A+Ox3 with appropriate mood, compliant with current treatment plan. Denies hallucinations, denies suicidal ideation/thoughts to harm self. Discharge instructions provided to pt, no questions or concerns.

## 2019-04-29 NOTE — DISCHARGE NOTE BEHAVIORAL HEALTH - NSBHDCSUICFCTRMIT_PSY_A_CORE
Pt was willing to come to the ER and get help on her own, she is willing to comply with medication, if symptoms worsen, please call 911 or go to nearest ER.

## 2019-04-29 NOTE — CHART NOTE - NSCHARTNOTEFT_GEN_A_CORE
Social Work Note:    Treatment team met with patient to discuss treatment plan, medications and discharge plan.  During the day patient is observed to be visible on the unit.  She is encouraged to attend and actively participate in groups that are offered on the unit.  During interview patient was calm, cooperative and appropriate.  Nati was encouraged to attend the unit crisis skills group this afternoon.      No barriers to discharge identified at this time. Plan is for referral to [facility] outpatient mental health department – [place].  Patient is aware and agreeable to same.     Discharge is anticipated for tomorrow – Tuesday April 30 as she has submitted a 72 hour letter on Saturday in the afternoon.

## 2019-04-29 NOTE — PROGRESS NOTE BEHAVIORAL HEALTH - NSBHCHARTREVIEWVS_PSY_A_CORE FT
ICU Vital Signs Last 24 Hrs  T(C): 36.3 (29 Apr 2019 09:07), Max: 36.3 (29 Apr 2019 09:07)  T(F): 97.4 (29 Apr 2019 09:07), Max: 97.4 (29 Apr 2019 09:07)  HR: 75 (29 Apr 2019 09:07) (57 - 75)  BP: 114/72 (29 Apr 2019 09:07) (98/57 - 114/72)  BP(mean): --  ABP: --  ABP(mean): --  RR: 17 (29 Apr 2019 09:07) (16 - 17)  SpO2: --
T(C): 36.1 (04-27-19 @ 07:46), Max: 36.2 (04-26-19 @ 16:34)  HR: 55 (04-27-19 @ 07:46) (55 - 69)  BP: 114/56 (04-27-19 @ 07:46) (104/56 - 114/71)  RR: 16 (04-27-19 @ 07:46) (16 - 20)  SpO2: --
Vital Signs Last 24 Hrs  T(C): 36.7 (28 Apr 2019 12:10), Max: 36.7 (28 Apr 2019 12:10)  T(F): 98 (28 Apr 2019 12:10), Max: 98 (28 Apr 2019 12:10)  HR: 64 (28 Apr 2019 12:10) (64 - 77)  BP: 109/57 (28 Apr 2019 12:10) (93/80 - 114/62)  BP(mean): --  RR: 18 (28 Apr 2019 12:10) (16 - 18)  SpO2: --
ICU Vital Signs Last 24 Hrs  T(C): 36.1 (26 Apr 2019 09:31), Max: 37.6 (26 Apr 2019 00:47)  T(F): 96.9 (26 Apr 2019 09:31), Max: 99.6 (26 Apr 2019 00:47)  HR: 61 (26 Apr 2019 09:31) (61 - 96)  BP: 86/49 (26 Apr 2019 09:31) (86/49 - 117/73)  BP(mean): --  ABP: --  ABP(mean): --  RR: 18 (26 Apr 2019 09:31) (16 - 18)  SpO2: 100% (26 Apr 2019 00:47) (100% - 100%)

## 2019-04-29 NOTE — PROGRESS NOTE BEHAVIORAL HEALTH - THOUGHT CONTENT
Unremarkable/Hopelessness
Hopelessness/Unremarkable
Unremarkable/Hopelessness
Unremarkable/Hopelessness

## 2019-04-29 NOTE — PROGRESS NOTE BEHAVIORAL HEALTH - PROBLEM SELECTOR PROBLEM 1
MDD (major depressive disorder), recurrent episode, moderate
MDD (major depressive disorder), recurrent episode, moderate

## 2019-04-29 NOTE — PROGRESS NOTE BEHAVIORAL HEALTH - PROBLEM SELECTOR PLAN 1
- Start Remeron 15 mg qhs as pt found it beneficial in the past  - Vistaril 25 mg q8h prn anxiety/insomnia
- Continue Remeron 15 mg qhs   - Vistaril 25 mg q8h prn anxiety/insomnia

## 2019-04-29 NOTE — PROGRESS NOTE BEHAVIORAL HEALTH - PRIMARY DX
Major depressive disorder, recurrent episode with anxious distress

## 2019-04-29 NOTE — DISCHARGE NOTE BEHAVIORAL HEALTH - NSBHDCMEDSFT_PSY_A_CORE
Patient was started on Remeron 15 mg qhs as she had responded well to it in the past. Patient was compliant with the medications and denied any side-effects of the medications.

## 2019-04-29 NOTE — DISCHARGE NOTE BEHAVIORAL HEALTH - NSTOBACCOREFERRAL_GEN_A_NCS
Yes Patient declined information Patient refused registration and referral to the [place] [facility]./Yes

## 2019-04-29 NOTE — DISCHARGE NOTE BEHAVIORAL HEALTH - NSBHDCSWCOMMENTSFT_PSY_A_CORE
Discharge Summary Faxed to [facility] [phone] on 04/30/2019 at 1pm.

## 2019-04-29 NOTE — DISCHARGE NOTE BEHAVIORAL HEALTH - MEDICATION SUMMARY - MEDICATIONS TO TAKE
I will START or STAY ON the medications listed below when I get home from the hospital:    mirtazapine 15 mg oral tablet  -- 1 tab(s) by mouth once a day (at bedtime) x 14 days   -- Indication: For Depression I will START or STAY ON the medications listed below when I get home from the hospital:    mirtazapine 15 mg oral tablet  -- 1 tab(s) by mouth once a day (at bedtime) x 14 days   -- Indication: For Depression    hydrOXYzine hydrochloride 25 mg oral tablet  -- 1 tab(s) by mouth every 8 hours x 14 days, As Needed -anxiety/insomnia   -- Indication: For Anxiety

## 2019-04-29 NOTE — PROGRESS NOTE ADULT - SUBJECTIVE AND OBJECTIVE BOX
pt stable alert in NAD  no new complaints    SUICIDAL IDEATION;DEPRESSION  ^PSYCH EVAL  No pertinent family history in first degree relatives  Handoff  MEWS Score  Recurrent major depressive disorder, remission status unspecified  Suicidal ideation  MDD (major depressive disorder), recurrent episode, moderate  Recurrent major depressive disorder, remission status unspecified  Anxiety  Depression  Suicidal ideation  Cannabis abuse  Major depressive disorder, recurrent episode with anxious distress  No significant past surgical history  PSYCH EVAL  90+  Depression    HEALTH ISSUES - PROBLEM Dx:  MDD (major depressive disorder), recurrent episode, moderate: MDD (major depressive disorder), recurrent episode, moderate  Recurrent major depressive disorder, remission status unspecified: Recurrent major depressive disorder, remission status unspecified  Anxiety: Anxiety  Depression: Depression  Suicidal ideation: Suicidal ideation  Cannabis abuse  Major depressive disorder, recurrent episode with anxious distress        PAST MEDICAL & SURGICAL HISTORY:  Recurrent major depressive disorder, remission status unspecified: plan as per psychiatrist   medication as per PMD  No significant past surgical history    No Known Drug Allergies  peanuts (Other)  Seafood (Other)      FAMILY HISTORY:      acetaminophen   Tablet .. 650 milliGRAM(s) Oral every 6 hours PRN  aluminum hydroxide/magnesium hydroxide/simethicone Suspension 30 milliLiter(s) Oral every 4 hours PRN  hydrOXYzine hydrochloride 25 milliGRAM(s) Oral every 8 hours PRN  mirtazapine 15 milliGRAM(s) Oral at bedtime  nicotine  Polacrilex Gum 2 milliGRAM(s) Oral every 2 hours PRN      T(C): 36.2 (04-29-19 @ 06:20), Max: 36.7 (04-28-19 @ 12:10)  HR: 57 (04-29-19 @ 06:20) (57 - 70)  BP: 98/57 (04-29-19 @ 06:20) (98/57 - 114/67)  RR: 16 (04-29-19 @ 06:20) (16 - 18)  SpO2: --    PE;  general:  no changes noted from previosu in nad    Lungs:    Heart:    EXT:    Neuro:  aelrt no defciits                          CAPILLARY BLOOD GLUCOSE

## 2019-04-29 NOTE — PROGRESS NOTE BEHAVIORAL HEALTH - NSBHFUPINTERVALHXFT_PSY_A_CORE
Pt was seen, and evaluated, and chart was reviewed. No acute events since arrival to the unit.  Patient is alert, Ox3,  calm, cooperative, compliant with treatment. Patient is in good behavioral control.  Pt presents no acute management problems.    Pt admits to passive SI on admission.  ***Pt denies and presents no evidence for suicidal or homicidal plans or intentions.  Pt is not acutely psychotic and denies A/V H.  ***Pt slept , and ate breakfast no c/o re bowel movements. Pt is tolerating meds well w/o any acute S/E.  Pt has no medication related complaints. Continues current medication regimen.
Pt was seen, and evaluated, and chart was reviewed. No acute events since arrival to the unit.  Patient is alert, Ox3,  calm, cooperative, compliant with treatment. Patient is in good behavioral control.  Pt presents no acute management problems.    Pt admits to passive SI on admission.  ***Pt denies and presents no evidence for suicidal or homicidal plans or intentions.  Pt is not acutely psychotic and denies A/V H.  ***Pt slept , and ate breakfast no c/o re bowel movements. Pt is tolerating meds well w/o any acute S/E.  Pt has no medication related complaints. Continues current medication regimen. No acte event overnight
Pt was seen, evaluated, chart reviewed.  As per nursing staff, no events over the weekend, however pt put in a 72 hr discharge letter on 4/27/19. On evaluation, pt reports that she is doing well. States that she is much improved. Reports that she "got her appetite back" and states that her sleep is better. Appears to giovanna n better spirits. Is future-oriented. Is compliant with medication, denies negative side effects. Endorsed good sleep and appetite. Denied A/V hallucinations. Denied paranoia. Denied suicidal/homicidal ideation, intent or plan.    As per mother, Graciela, she reports that she feels that pt is in "much better spirits." Reports that she is ready for pt to come home tomorrow. States that her best friend will pick her up from the hospital. Has no safety concerns
As per HPI.

## 2019-04-29 NOTE — PROGRESS NOTE BEHAVIORAL HEALTH - NSBHADMITDANGERSELF_PSY_A_CORE
suicidal ideation
suicidal ideation
suicidal ideation with plan and means
suicidal ideation with plan and means

## 2019-04-29 NOTE — DISCHARGE NOTE BEHAVIORAL HEALTH - MEDICATION SUMMARY - MEDICATIONS TO STOP TAKING
I will STOP taking the medications listed below when I get home from the hospital:    Nicoderm C-Q 7 mg/24 hr transdermal film, extended release  -- 1 patch by transdermal patch once a day    polyethylene glycol 3350 oral powder for reconstitution  -- 17 gram(s) by mouth once a day

## 2019-04-29 NOTE — DISCHARGE NOTE BEHAVIORAL HEALTH - PAST PSYCHIATRIC HISTORY
1 prior IPP admission in 03/2018. subsequent PHP.   She had a suicide attempt by hanging in 2017, and a suicide attempt of overdosing on xanax+ alcohol in 03/2018 which resulting in her going to the [facility] ED, from which she was discharged.  Patient self stopped taking Remeron and seeing outpatient provider 2-3 months after last discharged from [facility]

## 2019-04-30 VITALS
RESPIRATION RATE: 16 BRPM | SYSTOLIC BLOOD PRESSURE: 107 MMHG | TEMPERATURE: 98 F | DIASTOLIC BLOOD PRESSURE: 67 MMHG | HEART RATE: 78 BPM

## 2019-04-30 RX ORDER — HYDROXYZINE HCL 10 MG
1 TABLET ORAL
Qty: 42 | Refills: 0 | OUTPATIENT
Start: 2019-04-30 | End: 2019-05-13

## 2019-04-30 NOTE — CHART NOTE - NSCHARTNOTEFT_GEN_A_CORE
Social Work Discharge Note:    Patient is for discharge today.   She is alert and oriented x3.  Mood is improved.  Anxiety has decreased.  Insight and judgment have improved.  Suicidal / homicidal ideation denied.      Patient will be discharged to her home in [place] with her family.  Plan is for referral to [facility] outpatient mental health department – [place].  She is aware and agreeable to same.      Patient is aware and agreeable to discharge today.

## 2019-05-05 DIAGNOSIS — R45.851 SUICIDAL IDEATIONS: ICD-10-CM

## 2019-05-05 DIAGNOSIS — Z81.8 FAMILY HISTORY OF OTHER MENTAL AND BEHAVIORAL DISORDERS: ICD-10-CM

## 2019-05-05 DIAGNOSIS — Z91.14 PATIENT'S OTHER NONCOMPLIANCE WITH MEDICATION REGIMEN: ICD-10-CM

## 2019-05-05 DIAGNOSIS — Z91.5 PERSONAL HISTORY OF SELF-HARM: ICD-10-CM

## 2019-05-05 DIAGNOSIS — Z87.898 PERSONAL HISTORY OF OTHER SPECIFIED CONDITIONS: ICD-10-CM

## 2019-05-05 DIAGNOSIS — F12.10 CANNABIS ABUSE, UNCOMPLICATED: ICD-10-CM

## 2019-05-05 DIAGNOSIS — F33.1 MAJOR DEPRESSIVE DISORDER, RECURRENT, MODERATE: ICD-10-CM

## 2019-05-05 DIAGNOSIS — F41.8 OTHER SPECIFIED ANXIETY DISORDERS: ICD-10-CM

## 2019-05-08 LAB
CARBOXYTHC UR CFM-MCNC: 508 NG/ML — SIGNIFICANT CHANGE UP
CARBOXYTHC UR QL SCN: 192.9 MG/DL — SIGNIFICANT CHANGE UP
CARBOXYTHC UR QL SCN: 263 — SIGNIFICANT CHANGE UP
THC CREATININE URINE: 192.9 MG/DL — SIGNIFICANT CHANGE UP
THC METABOLITE/CREAT URINE: 263 — SIGNIFICANT CHANGE UP

## 2019-05-09 ENCOUNTER — OUTPATIENT (OUTPATIENT)
Dept: OUTPATIENT SERVICES | Facility: HOSPITAL | Age: 19
LOS: 1 days | Discharge: HOME | End: 2019-05-09

## 2019-05-10 DIAGNOSIS — F33.9 MAJOR DEPRESSIVE DISORDER, RECURRENT, UNSPECIFIED: ICD-10-CM

## 2019-05-10 DIAGNOSIS — F12.10 CANNABIS ABUSE, UNCOMPLICATED: ICD-10-CM

## 2019-05-22 ENCOUNTER — OUTPATIENT (OUTPATIENT)
Dept: OUTPATIENT SERVICES | Facility: HOSPITAL | Age: 19
LOS: 1 days | Discharge: HOME | End: 2019-05-22

## 2019-05-22 DIAGNOSIS — F12.10 CANNABIS ABUSE, UNCOMPLICATED: ICD-10-CM

## 2019-05-22 DIAGNOSIS — F33.9 MAJOR DEPRESSIVE DISORDER, RECURRENT, UNSPECIFIED: ICD-10-CM

## 2019-06-02 ENCOUNTER — EMERGENCY (EMERGENCY)
Facility: HOSPITAL | Age: 19
LOS: 0 days | Discharge: HOME | End: 2019-06-03
Attending: EMERGENCY MEDICINE | Admitting: EMERGENCY MEDICINE
Payer: COMMERCIAL

## 2019-06-02 VITALS
DIASTOLIC BLOOD PRESSURE: 80 MMHG | SYSTOLIC BLOOD PRESSURE: 125 MMHG | RESPIRATION RATE: 20 BRPM | HEART RATE: 78 BPM | OXYGEN SATURATION: 100 % | TEMPERATURE: 98 F | WEIGHT: 85.98 LBS

## 2019-06-02 DIAGNOSIS — S32.010A WEDGE COMPRESSION FRACTURE OF FIRST LUMBAR VERTEBRA, INITIAL ENCOUNTER FOR CLOSED FRACTURE: ICD-10-CM
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
## 2019-06-02 DIAGNOSIS — Z91.010 ALLERGY TO PEANUTS: ICD-10-CM
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
## 2019-06-02 DIAGNOSIS — Z91.013 ALLERGY TO SEAFOOD: ICD-10-CM
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
## 2019-06-02 DIAGNOSIS — Z79.811 LONG TERM (CURRENT) USE OF AROMATASE INHIBITORS: ICD-10-CM

## 2019-06-02 DIAGNOSIS — Z20.2 CONTACT WITH AND (SUSPECTED) EXPOSURE TO INFECTIONS WITH A PREDOMINANTLY SEXUAL MODE OF TRANSMISSION: ICD-10-CM
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
## 2019-06-02 PROCEDURE — 99283 EMERGENCY DEPT VISIT LOW MDM: CPT | Mod: 25
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
## 2019-06-03 LAB
C TRACH RRNA SPEC QL NAA+PROBE: SIGNIFICANT CHANGE UP
HIV 1 & 2 AB SERPL IA.RAPID: SIGNIFICANT CHANGE UP
N GONORRHOEA RRNA SPEC QL NAA+PROBE: SIGNIFICANT CHANGE UP
SPECIMEN SOURCE: SIGNIFICANT CHANGE UP

## 2019-06-03 PROCEDURE — 73130 X-RAY EXAM OF HAND: CPT | Mod: 26,RT

## 2019-06-03 PROCEDURE — 72070 X-RAY EXAM THORAC SPINE 2VWS: CPT | Mod: 26

## 2019-06-03 RX ORDER — TETANUS TOXOID, REDUCED DIPHTHERIA TOXOID AND ACELLULAR PERTUSSIS VACCINE, ADSORBED 5; 2.5; 8; 8; 2.5 [IU]/.5ML; [IU]/.5ML; UG/.5ML; UG/.5ML; UG/.5ML
0.5 SUSPENSION INTRAMUSCULAR ONCE
Refills: 0 | Status: COMPLETED | OUTPATIENT
Start: 2019-06-03 | End: 2019-06-03

## 2019-06-03 RX ORDER — AZITHROMYCIN 500 MG/1
1000 TABLET, FILM COATED ORAL ONCE
Refills: 0 | Status: COMPLETED | OUTPATIENT
Start: 2019-06-03 | End: 2019-06-03

## 2019-06-03 RX ORDER — IBUPROFEN 200 MG
600 TABLET ORAL ONCE
Refills: 0 | Status: COMPLETED | OUTPATIENT
Start: 2019-06-03 | End: 2019-06-03

## 2019-06-03 RX ORDER — METRONIDAZOLE 500 MG
2000 TABLET ORAL ONCE
Refills: 0 | Status: COMPLETED | OUTPATIENT
Start: 2019-06-03 | End: 2019-06-03

## 2019-06-03 RX ORDER — CEFTRIAXONE 500 MG/1
250 INJECTION, POWDER, FOR SOLUTION INTRAMUSCULAR; INTRAVENOUS ONCE
Refills: 0 | Status: COMPLETED | OUTPATIENT
Start: 2019-06-03 | End: 2019-06-03

## 2019-06-03 RX ORDER — LEVONORGESTREL 1.5 MG/1
1.5 TABLET ORAL ONCE
Refills: 0 | Status: COMPLETED | OUTPATIENT
Start: 2019-06-03 | End: 2019-06-03

## 2019-06-03 RX ADMIN — TETANUS TOXOID, REDUCED DIPHTHERIA TOXOID AND ACELLULAR PERTUSSIS VACCINE, ADSORBED 0.5 MILLILITER(S): 5; 2.5; 8; 8; 2.5 SUSPENSION INTRAMUSCULAR at 01:55

## 2019-06-03 RX ADMIN — Medication 2000 MILLIGRAM(S): at 05:38

## 2019-06-03 RX ADMIN — CEFTRIAXONE 250 MILLIGRAM(S): 500 INJECTION, POWDER, FOR SOLUTION INTRAMUSCULAR; INTRAVENOUS at 06:14

## 2019-06-03 RX ADMIN — Medication 600 MILLIGRAM(S): at 03:10

## 2019-06-03 RX ADMIN — Medication 600 MILLIGRAM(S): at 03:40

## 2019-06-03 RX ADMIN — LEVONORGESTREL 1.5 MILLIGRAM(S): 1.5 TABLET ORAL at 05:41

## 2019-06-03 RX ADMIN — AZITHROMYCIN 1000 MILLIGRAM(S): 500 TABLET, FILM COATED ORAL at 05:37
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
## 2019-06-03 NOTE — ED PROVIDER NOTE - NS ED ROS FT
Constitutional: No altered mental status.  Eyes: No visual changes.  ENT: No hearing loss.  Neck: + neck pain, no stiffness.  Cardiovascular: No chest pain, palpitations.  Pulmonary: No SOB. No hemoptysis.  Abdominal: No abdominal pain, nausea, vomiting.   : No hematuria.  Neuro: No headache, syncope, dizziness.  MS: + back pain. No deformities.  Psych: No suicidal ideations.
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
## 2019-06-03 NOTE — ED PROVIDER NOTE - PHYSICAL EXAMINATION
CONSTITUTIONAL: Well-developed; well-nourished; in no acute distress. Non toxic appearing,   SKIN: warm, dry, no acute rash, + multiple scattered abrasionsand areas of ecchymosis over the LUE> RUE, LLE> RLE.   HEAD: Normocephalic; no skull indentations, no contusions or lacerations. no battles sign or raccoon eyes.   EENT:no gross trauma bilaterally, no proptosis conjunctiva and sclera clear. No entrapment.  MM moist, no nasal discharge.  No septal hematoma. Dentition intact. Pharynx unremarkable. TM's unremarkable, no bulging, normal light reflex, no hemotympanum.   NECK: Supple, no midline tenderness, normal ROM, no midline ttp or stepoffs  BACK: +ttp over the T/L spine. no stepoffs.  CHEST: No bruising, sub cutaneous emphysema, or crepitus, nttp.  CARD: S1, S2 normal; no murmurs, gallops, or rubs. Regular rate and rhythm.   RESP: No wheezes, rales or rhonchi.  ABD: soft ntnd no seatbelt sign  BACK: no midline tenderness or step offs  PELVIS: no laxity with lateral compression  : Normal appearing female genitalia with multiple areas of scattered ecchymosis and a large area of ecchymosis over the right pelvic/upper thigh region. no masses/erythema. +off white mucous discharge noted in the vault. Os closed. No active bleeding. No CMT or adnexal tenderness.  EXT: no gross extremity injury  NEURO: gcs 15, moving all extremities grossly, following commands  PSYCH: Cooperative, appropriate
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
## 2019-06-03 NOTE — ED PROVIDER NOTE - NSFOLLOWUPCLINICS_GEN_ALL_ED_FT
[facility]  OP Mental Health  [address] [place] [address]  Phone: [phone]  Fax:     [facility] ([facility])  Rehabilitation  [address] [place] [address]  Phone: [phone]  Fax:     [facility]  Rehabilitation  [address] [place] [address]  Phone: [phone]  Fax:     [facility] Disease [facility]  Infectious Disease  [address] [place] [address]  Phone: [phone]  Fax:   Follow Up Time:
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
## 2019-06-05 ENCOUNTER — OUTPATIENT (OUTPATIENT)
Dept: OUTPATIENT SERVICES | Facility: HOSPITAL | Age: 19
LOS: 1 days | Discharge: HOME | End: 2019-06-05

## 2019-06-05 DIAGNOSIS — F33.9 MAJOR DEPRESSIVE DISORDER, RECURRENT, UNSPECIFIED: ICD-10-CM

## 2019-06-05 DIAGNOSIS — F12.10 CANNABIS ABUSE, UNCOMPLICATED: ICD-10-CM

## 2019-06-09 ENCOUNTER — EMERGENCY (EMERGENCY)
Facility: HOSPITAL | Age: 19
LOS: 0 days | Discharge: HOME | End: 2019-06-09
Attending: EMERGENCY MEDICINE | Admitting: EMERGENCY MEDICINE

## 2019-06-09 VITALS
HEART RATE: 77 BPM | TEMPERATURE: 97 F | SYSTOLIC BLOOD PRESSURE: 96 MMHG | DIASTOLIC BLOOD PRESSURE: 54 MMHG | OXYGEN SATURATION: 99 % | RESPIRATION RATE: 18 BRPM

## 2019-06-09 DIAGNOSIS — Z48.02 ENCOUNTER FOR REMOVAL OF SUTURES: ICD-10-CM
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
## 2019-06-09 DIAGNOSIS — Z91.013 ALLERGY TO SEAFOOD: ICD-10-CM

## 2019-06-09 DIAGNOSIS — S61.419D: ICD-10-CM
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
## 2019-06-09 DIAGNOSIS — Z91.010 ALLERGY TO PEANUTS: ICD-10-CM

## 2019-06-09 NOTE — ED PROVIDER NOTE - OBJECTIVE STATEMENT
pt to ED for suture removal from palm - placed 1wk ago after lac from fall, no bleeding, no s/s of cellulitis and FROM of hand joint

## 2019-06-09 NOTE — ED PROVIDER NOTE - NS ED ROS FT
ROS  Constitutional:  See HPI.  Eyes:  No visual changes, eye pain or discharge.    Neuro:  No focal neurological complaints.  Skin:  No skin rash.

## 2019-06-09 NOTE — ED PROVIDER NOTE - PHYSICAL EXAMINATION
EXAM:  CONSTITUTIONAL: WA / WN / NAD  HEAD: NCAT    MSK/EXT: No gross deformities; full range of motion.  SKIN: Warm and dry;   NEURO: AAOx3,   PSYCH: Memory Intact, Normal Affect

## 2019-06-12 ENCOUNTER — OUTPATIENT (OUTPATIENT)
Dept: OUTPATIENT SERVICES | Facility: HOSPITAL | Age: 19
LOS: 1 days | Discharge: HOME | End: 2019-06-12
Payer: COMMERCIAL

## 2019-06-12 DIAGNOSIS — M54.5 LOW BACK PAIN: ICD-10-CM

## 2019-06-12 PROCEDURE — 72148 MRI LUMBAR SPINE W/O DYE: CPT | Mod: 26

## 2019-06-13 PROBLEM — Z00.00 ENCOUNTER FOR PREVENTIVE HEALTH EXAMINATION: Status: ACTIVE | Noted: 2019-06-13

## 2019-06-14 ENCOUNTER — OUTPATIENT (OUTPATIENT)
Dept: OUTPATIENT SERVICES | Facility: HOSPITAL | Age: 19
LOS: 1 days | Discharge: HOME | End: 2019-06-14

## 2019-06-14 DIAGNOSIS — F33.9 MAJOR DEPRESSIVE DISORDER, RECURRENT, UNSPECIFIED: ICD-10-CM

## 2019-06-14 DIAGNOSIS — F12.10 CANNABIS ABUSE, UNCOMPLICATED: ICD-10-CM

## 2019-06-18 ENCOUNTER — OUTPATIENT (OUTPATIENT)
Dept: OUTPATIENT SERVICES | Facility: HOSPITAL | Age: 19
LOS: 1 days | Discharge: HOME | End: 2019-06-18

## 2019-06-18 DIAGNOSIS — F33.9 MAJOR DEPRESSIVE DISORDER, RECURRENT, UNSPECIFIED: ICD-10-CM

## 2019-06-18 DIAGNOSIS — F12.10 CANNABIS ABUSE, UNCOMPLICATED: ICD-10-CM

## 2019-06-19 ENCOUNTER — APPOINTMENT (OUTPATIENT)
Dept: NEUROSURGERY | Facility: CLINIC | Age: 19
End: 2019-06-19
Payer: COMMERCIAL

## 2019-06-19 VITALS — WEIGHT: 90 LBS | HEIGHT: 60 IN | BODY MASS INDEX: 17.67 KG/M2

## 2019-06-19 DIAGNOSIS — F17.200 NICOTINE DEPENDENCE, UNSPECIFIED, UNCOMPLICATED: ICD-10-CM

## 2019-06-19 DIAGNOSIS — Z87.898 PERSONAL HISTORY OF OTHER SPECIFIED CONDITIONS: ICD-10-CM

## 2019-06-19 PROCEDURE — 99204 OFFICE O/P NEW MOD 45 MIN: CPT

## 2019-06-19 NOTE — PLAN
[FreeTextEntry1] : Patient is ordered for an MRI of the lumbar spine without contrast to be done in three months. She will cont with physical therapy. We discussed at length increaing her physical activity slowly. She will take tylenol/NSAIDs as needed PRN. She will return to the office/ER if she has any new or concerning symptoms.
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
## 2019-07-03 ENCOUNTER — OUTPATIENT (OUTPATIENT)
Dept: OUTPATIENT SERVICES | Facility: HOSPITAL | Age: 19
LOS: 1 days | Discharge: HOME | End: 2019-07-03

## 2019-07-03 DIAGNOSIS — F12.10 CANNABIS ABUSE, UNCOMPLICATED: ICD-10-CM

## 2019-07-03 DIAGNOSIS — F33.9 MAJOR DEPRESSIVE DISORDER, RECURRENT, UNSPECIFIED: ICD-10-CM

## 2019-07-31 ENCOUNTER — OUTPATIENT (OUTPATIENT)
Dept: OUTPATIENT SERVICES | Facility: HOSPITAL | Age: 19
LOS: 1 days | Discharge: HOME | End: 2019-07-31

## 2019-07-31 DIAGNOSIS — F33.9 MAJOR DEPRESSIVE DISORDER, RECURRENT, UNSPECIFIED: ICD-10-CM

## 2019-07-31 DIAGNOSIS — F12.10 CANNABIS ABUSE, UNCOMPLICATED: ICD-10-CM

## 2019-08-07 ENCOUNTER — OUTPATIENT (OUTPATIENT)
Dept: OUTPATIENT SERVICES | Facility: HOSPITAL | Age: 19
LOS: 1 days | Discharge: HOME | End: 2019-08-07

## 2019-08-07 DIAGNOSIS — F12.10 CANNABIS ABUSE, UNCOMPLICATED: ICD-10-CM

## 2019-08-07 DIAGNOSIS — F33.9 MAJOR DEPRESSIVE DISORDER, RECURRENT, UNSPECIFIED: ICD-10-CM

## 2019-09-10 ENCOUNTER — OUTPATIENT (OUTPATIENT)
Dept: OUTPATIENT SERVICES | Facility: HOSPITAL | Age: 19
LOS: 1 days | Discharge: HOME | End: 2019-09-10
Payer: COMMERCIAL

## 2019-09-10 DIAGNOSIS — F33.1 MAJOR DEPRESSIVE DISORDER, RECURRENT, MODERATE: ICD-10-CM

## 2019-09-10 PROCEDURE — 90792 PSYCH DIAG EVAL W/MED SRVCS: CPT | Mod: GC

## 2019-09-18 ENCOUNTER — OUTPATIENT (OUTPATIENT)
Dept: OUTPATIENT SERVICES | Facility: HOSPITAL | Age: 19
LOS: 1 days | Discharge: HOME | End: 2019-09-18

## 2019-09-18 ENCOUNTER — APPOINTMENT (OUTPATIENT)
Dept: NEUROSURGERY | Facility: CLINIC | Age: 19
End: 2019-09-18

## 2019-09-18 DIAGNOSIS — F33.1 MAJOR DEPRESSIVE DISORDER, RECURRENT, MODERATE: ICD-10-CM

## 2019-09-25 ENCOUNTER — OUTPATIENT (OUTPATIENT)
Dept: OUTPATIENT SERVICES | Facility: HOSPITAL | Age: 19
LOS: 1 days | Discharge: HOME | End: 2019-09-25

## 2019-09-25 DIAGNOSIS — F33.1 MAJOR DEPRESSIVE DISORDER, RECURRENT, MODERATE: ICD-10-CM

## 2019-10-02 ENCOUNTER — OUTPATIENT (OUTPATIENT)
Dept: OUTPATIENT SERVICES | Facility: HOSPITAL | Age: 19
LOS: 1 days | Discharge: HOME | End: 2019-10-02

## 2019-10-02 DIAGNOSIS — M54.89 OTHER DORSALGIA: ICD-10-CM

## 2019-10-02 DIAGNOSIS — G56.00 CARPAL TUNNEL SYNDROME, UNSPECIFIED UPPER LIMB: ICD-10-CM

## 2019-10-08 ENCOUNTER — OUTPATIENT (OUTPATIENT)
Dept: OUTPATIENT SERVICES | Facility: HOSPITAL | Age: 19
LOS: 1 days | Discharge: HOME | End: 2019-10-08
Payer: COMMERCIAL

## 2019-10-08 DIAGNOSIS — F33.1 MAJOR DEPRESSIVE DISORDER, RECURRENT, MODERATE: ICD-10-CM

## 2019-10-08 PROCEDURE — 99213 OFFICE O/P EST LOW 20 MIN: CPT | Mod: GC

## 2019-10-10 ENCOUNTER — OUTPATIENT (OUTPATIENT)
Dept: OUTPATIENT SERVICES | Facility: HOSPITAL | Age: 19
LOS: 1 days | Discharge: HOME | End: 2019-10-10

## 2019-10-10 DIAGNOSIS — F33.1 MAJOR DEPRESSIVE DISORDER, RECURRENT, MODERATE: ICD-10-CM

## 2019-10-25 ENCOUNTER — OUTPATIENT (OUTPATIENT)
Dept: OUTPATIENT SERVICES | Facility: HOSPITAL | Age: 19
LOS: 1 days | Discharge: HOME | End: 2019-10-25

## 2019-10-25 DIAGNOSIS — F33.1 MAJOR DEPRESSIVE DISORDER, RECURRENT, MODERATE: ICD-10-CM

## 2019-11-07 ENCOUNTER — OUTPATIENT (OUTPATIENT)
Dept: OUTPATIENT SERVICES | Facility: HOSPITAL | Age: 19
LOS: 1 days | Discharge: HOME | End: 2019-11-07
Payer: COMMERCIAL

## 2019-11-07 DIAGNOSIS — R00.0 TACHYCARDIA, UNSPECIFIED: ICD-10-CM

## 2019-11-07 PROCEDURE — 93010 ELECTROCARDIOGRAM REPORT: CPT

## 2019-11-07 NOTE — ED PEDIATRIC NURSE NOTE - NS ED NURSE PATIENT LEFT UNIT TIME
----- Message from Julie R. Jeansonne, MD sent at 11/7/2019  2:43 PM CST -----  Regarding: colpo apt  Hey! She needs a colpo scheduled. I sent her a message with results and told her one of my partners may need to do it.    Thanks!  
Spoke with pt and scheduled Colpo at [facility] with Dr. Jeansonne for 2pm. Pt verbalized understanding.   
05:23

## 2019-11-08 ENCOUNTER — OUTPATIENT (OUTPATIENT)
Dept: OUTPATIENT SERVICES | Facility: HOSPITAL | Age: 19
LOS: 1 days | Discharge: HOME | End: 2019-11-08

## 2019-11-08 DIAGNOSIS — F33.1 MAJOR DEPRESSIVE DISORDER, RECURRENT, MODERATE: ICD-10-CM

## 2019-11-13 ENCOUNTER — OUTPATIENT (OUTPATIENT)
Dept: OUTPATIENT SERVICES | Facility: HOSPITAL | Age: 19
LOS: 1 days | Discharge: HOME | End: 2019-11-13
Payer: COMMERCIAL

## 2019-11-13 DIAGNOSIS — F33.1 MAJOR DEPRESSIVE DISORDER, RECURRENT, MODERATE: ICD-10-CM

## 2019-11-13 PROCEDURE — 99214 OFFICE O/P EST MOD 30 MIN: CPT | Mod: GC

## 2019-12-02 ENCOUNTER — FORM ENCOUNTER (OUTPATIENT)
Age: 19
End: 2019-12-02

## 2019-12-03 ENCOUNTER — OUTPATIENT (OUTPATIENT)
Dept: OUTPATIENT SERVICES | Facility: HOSPITAL | Age: 19
LOS: 1 days | Discharge: HOME | End: 2019-12-03
Payer: COMMERCIAL

## 2019-12-03 DIAGNOSIS — S32.009A UNSPECIFIED FRACTURE OF UNSPECIFIED LUMBAR VERTEBRA, INITIAL ENCOUNTER FOR CLOSED FRACTURE: ICD-10-CM

## 2019-12-03 PROCEDURE — 72148 MRI LUMBAR SPINE W/O DYE: CPT | Mod: 26

## 2019-12-11 ENCOUNTER — APPOINTMENT (OUTPATIENT)
Dept: NEUROSURGERY | Facility: CLINIC | Age: 19
End: 2019-12-11
Payer: COMMERCIAL

## 2019-12-11 VITALS — WEIGHT: 95 LBS | HEIGHT: 60 IN | BODY MASS INDEX: 18.65 KG/M2

## 2019-12-11 DIAGNOSIS — Z78.9 OTHER SPECIFIED HEALTH STATUS: ICD-10-CM

## 2019-12-11 DIAGNOSIS — Z72.89 OTHER PROBLEMS RELATED TO LIFESTYLE: ICD-10-CM

## 2019-12-11 PROCEDURE — 99214 OFFICE O/P EST MOD 30 MIN: CPT

## 2019-12-11 RX ORDER — HYDROXYZINE HYDROCHLORIDE 25 MG/1
25 TABLET ORAL
Qty: 21 | Refills: 0 | Status: ACTIVE | COMMUNITY
Start: 2019-09-10

## 2019-12-11 RX ORDER — MIRTAZAPINE 15 MG/1
15 TABLET, FILM COATED ORAL
Qty: 90 | Refills: 0 | Status: ACTIVE | COMMUNITY
Start: 2019-09-10
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
## 2019-12-11 NOTE — ASSESSMENT
[FreeTextEntry1] : We had a thorough discussion regarding her findings. Her prior fractures have healed. She continues to have a defect at the L1 segment. I have recommended posture correction along with core / back strengthening exercise. I am referring her back to PT for this. She is also going to start using a posture correction brace that she saw online. I will see her back in 3 months. If she continues to have pain, xrays of the thoracolumbar spine will be obtained for that visit. If her residual pain improves then xrays will not be needed. She will call barring any issues.

## 2019-12-31 ENCOUNTER — OUTPATIENT (OUTPATIENT)
Dept: OUTPATIENT SERVICES | Facility: HOSPITAL | Age: 19
LOS: 1 days | Discharge: HOME | End: 2019-12-31
Payer: COMMERCIAL

## 2019-12-31 DIAGNOSIS — F33.1 MAJOR DEPRESSIVE DISORDER, RECURRENT, MODERATE: ICD-10-CM

## 2019-12-31 PROCEDURE — 99213 OFFICE O/P EST LOW 20 MIN: CPT | Mod: GC

## 2020-01-07 ENCOUNTER — OUTPATIENT (OUTPATIENT)
Dept: OUTPATIENT SERVICES | Facility: HOSPITAL | Age: 20
LOS: 1 days | Discharge: HOME | End: 2020-01-07

## 2020-01-07 DIAGNOSIS — F33.1 MAJOR DEPRESSIVE DISORDER, RECURRENT, MODERATE: ICD-10-CM

## 2020-01-17 ENCOUNTER — OUTPATIENT (OUTPATIENT)
Dept: OUTPATIENT SERVICES | Facility: HOSPITAL | Age: 20
LOS: 1 days | Discharge: HOME | End: 2020-01-17

## 2020-01-17 DIAGNOSIS — F33.1 MAJOR DEPRESSIVE DISORDER, RECURRENT, MODERATE: ICD-10-CM

## 2020-01-23 NOTE — ED PROCEDURE NOTE - CPROC ED SUTURE APPEARANCE1
Form received at [facility] on 1/23/2020.     Please note that it takes 7-10 business days for completion.     Signed authorization received with form.     clean

## 2020-03-12 ENCOUNTER — APPOINTMENT (OUTPATIENT)
Dept: NEUROSURGERY | Facility: CLINIC | Age: 20
End: 2020-03-12
Payer: COMMERCIAL

## 2020-03-12 VITALS — WEIGHT: 95 LBS | HEIGHT: 61 IN | BODY MASS INDEX: 17.94 KG/M2

## 2020-03-12 PROCEDURE — 99214 OFFICE O/P EST MOD 30 MIN: CPT

## 2020-03-12 NOTE — PHYSICAL EXAM
[FreeTextEntry1] : Constitutional: alert, in no acute distress and healthy appearing . She is thin. Posture is poor. \par Psychiatric: oriented to person, place, and time and the affect was normal. \par Orientation: oriented to person, oriented to place and oriented to time. \par Cranial Nerves: visual acuity intact bilaterally, pupils equal round and reactive to light, extraocular motion intact, facial sensation intact symmetrically, face symmetrical, hearing was intact bilaterally, tongue and palate midline, head turning and shoulder shrug symmetric and there was no tongue deviation with protrusion. \par Motor: muscle tone was normal in all four extremities and muscle strength was normal in all four extremities. \par Sensory exam: light touch was intact . Romberg's sign was negtive. \par Coordination:. normal gait.  \par Spine: Mild tenderness: level L1 lumbar spine. Special Tests: negative Straight Leg Raise. \par Gait: normal \par Motor Exam: all motor groups within normal limits of strength and tone bilaterally. \par Sensory Exam: all sensory within normal limits bilaterally. \par Deep Tendon Reflexes: all reflexes within normal limits bilaterally.

## 2020-03-12 NOTE — ASSESSMENT
[FreeTextEntry1] : We had a thorough discussion regarding her findings and condition. She will restart PT for core / back strengthening and posture correction. I will see her back in 3 months with xrays. She will call barring any issues. \par \par
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
## 2020-05-04 NOTE — ED PROVIDER NOTE - NS ED MD DISPO DISCHARGE
This is one of your patients that was scheduled in [facility] next week that I called to reschedule Pt. states she can not go to [place] for appt. And states still having same problems. Please advise.   Home

## 2020-06-11 ENCOUNTER — APPOINTMENT (OUTPATIENT)
Dept: NEUROSURGERY | Facility: CLINIC | Age: 20
End: 2020-06-11
Payer: COMMERCIAL

## 2020-06-11 VITALS — WEIGHT: 85 LBS | BODY MASS INDEX: 17.14 KG/M2 | HEIGHT: 59 IN

## 2020-06-11 DIAGNOSIS — S32.009A UNSPECIFIED FRACTURE OF UNSPECIFIED LUMBAR VERTEBRA, INITIAL ENCOUNTER FOR CLOSED FRACTURE: ICD-10-CM

## 2020-06-11 PROCEDURE — 99214 OFFICE O/P EST MOD 30 MIN: CPT

## 2020-06-11 RX ORDER — TERCONAZOLE 4 MG/G
0.4 CREAM VAGINAL
Qty: 45 | Refills: 0 | Status: DISCONTINUED | COMMUNITY
Start: 2019-08-19 | End: 2020-06-11

## 2020-06-11 NOTE — ASSESSMENT
[FreeTextEntry1] : Overall her condition remains stable. She has no acute complaints today. Since her last visit she was unable to start PT or have follow up xrays done due to the COVID-19 pandemic. I have given her a home exercise sheet to follow. She will consider restarting PT also. I would like her to get updated xrays of the thoracolumbar spine. She will call me for those results.
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
## 2020-06-11 NOTE — PHYSICAL EXAM
[FreeTextEntry1] : Constitutional: alert, in no acute distress and healthy appearing. She is thin. Posture is poor. \par Psychiatric: oriented to person, place, and time and the affect was normal. \par Orientation: oriented to person, oriented to place and oriented to time. \par Cranial Nerves: visual acuity intact bilaterally, pupils equal round and reactive to light, extraocular motion intact, facial sensation intact symmetrically, face symmetrical, hearing was intact bilaterally, tongue and palate midline, head turning and shoulder shrug symmetric and there was no tongue deviation with protrusion. \par Motor: muscle tone was normal in all four extremities and muscle strength was normal in all four extremities. \par Sensory exam: light touch was intact. Romberg's sign was negtive. \par Coordination:. normal gait. \par Spine: Mild tenderness: level L1 lumbar spine. Special Tests: negative Straight Leg Raise. \par Gait: normal \par Motor Exam: all motor groups within normal limits of strength and tone bilaterally. \par Sensory Exam: all sensory within normal limits bilaterally. \par Deep Tendon Reflexes: all reflexes within normal limits bilaterally.

## 2020-06-12 ENCOUNTER — OUTPATIENT (OUTPATIENT)
Dept: OUTPATIENT SERVICES | Facility: HOSPITAL | Age: 20
LOS: 1 days | Discharge: HOME | End: 2020-06-12
Payer: COMMERCIAL

## 2020-06-12 DIAGNOSIS — S32.009A UNSPECIFIED FRACTURE OF UNSPECIFIED LUMBAR VERTEBRA, INITIAL ENCOUNTER FOR CLOSED FRACTURE: ICD-10-CM

## 2020-06-12 PROCEDURE — 72114 X-RAY EXAM L-S SPINE BENDING: CPT | Mod: 26

## 2020-06-12 PROCEDURE — 72070 X-RAY EXAM THORAC SPINE 2VWS: CPT | Mod: 26

## 2020-07-24 NOTE — ED PEDIATRIC TRIAGE NOTE - CADM TRG TX PRIOR TO ARRIVAL
----- Message from Jerson Bass MD sent at 7/24/2020  2:07 PM CDT -----  THe PAP has returned negative/normal. The HPV screen is also negative and that would indicate that a repeat test in 3 years is fine.   none

## 2021-04-19 NOTE — PROGRESS NOTE BEHAVIORAL HEALTH - PROBLEM SELECTOR PROBLEM 1
Patient Education       External Ear Infection (Child)  Your child has an infection in the ear canal. This problem is also known as external otitis, otitis externa, or “swimmer’s ear.” It is usually caused by bacteria or fungus. It can occur if water is trapped in the ear canal (from swimming or bathing). Putting cotton swabs or other objects in the ear can also damage the skin in the ear canal and make this problem more likely.  Your child may have pain, itching, redness, drainage, or swelling of the ear canal. He or she may also have temporary hearing loss. In most cases, symptoms resolve within a week.  Home care  Follow these guidelines when caring for your child at home:  · Don’t try to clean the ear canal. This may push pus and bacteria deeper into the canal.  · Use prescribed eardrops as directed. These help reduce swelling and fight the infection. If an ear wick was placed in the ear canal, apply drops right onto the end of the wick. The wick will draw the medicine into the ear canal even if it is swollen closed.  · A cotton ball may be loosely placed in the outer ear to absorb any drainage.  · Don’t allow water to get into your child’s ear when he or she bathing. Also, don’t allow your child to go swimming for at least 7 to10 days after starting treatment.  · You may give your child acetaminophen to control pain, unless another pain medicine was prescribed. In children older than 6 months, you may use ibuprofen instead of acetaminophen. If your child has chronic liver or kidney disease, talk with the provider before using these medicines. Also talk with the provider if your child has had a stomach ulcer or gastrointestinal bleeding. Don’t give aspirin to a child younger than 18 years old who is ill with a fever. It may cause severe liver damage.  Prevention  · Don’t clean the inside of your child’s ears. Also, caution your child not to stick objects inside his or her ears.  · Have your child wear earplugs  when swimming.  · After exiting water, have your child turn his or her head to the side to drain any excess water from the ears. Ears should be dried well with a towel. A hair dryer may be used to dry the ears, but it needs to be on a low or cool setting and about 12 inches away from the ears.  · If your child feels water trapped in the ears, use ear drops right away. You can get these drops over the counter at most drugstores. They work by removing water from the ear canal.  Follow-up care  Follow up with your child’s healthcare provider, or as directed.  When to seek medical advice  Call your child's provider right away if any of these occur:  · Fever (see Fever and children, below)  · Symptoms worsen or do not get better after 3 days of treatment  · New symptoms appear  · Outer ear becomes red, warm, or swollen     Fever and children  Always use a digital thermometer to check your child’s temperature. Never use a mercury thermometer.  For infants and toddlers, be sure to use a rectal thermometer correctly. A rectal thermometer may accidentally poke a hole in (perforate) the rectum. It may also pass on germs from the stool. Always follow the product maker’s directions for proper use. If you don’t feel comfortable taking a rectal temperature, use another method. When you talk to your child’s healthcare provider, tell him or her which method you used to take your child’s temperature.  Here are guidelines for fever temperature. Ear temperatures aren’t accurate before 6 months of age. Don’t take an oral temperature until your child is at least 4 years old.  Infant under 3 months old:  · Ask your child’s healthcare provider how you should take the temperature.  · Rectal or forehead (temporal artery) temperature of 100.4°F (38°C) or higher, or as directed by the provider  · Armpit temperature of 99°F (37.2°C) or higher, or as directed by the provider  Child age 3 to 36 months:  · Rectal, forehead (temporal artery), or  ear temperature of 102°F (38.9°C) or higher, or as directed by the provider  · Armpit temperature of 101°F (38.3°C) or higher, or as directed by the provider  Child of any age:  · Repeated temperature of 104°F (40°C) or higher, or as directed by the provider  · Fever that lasts more than 24 hours in a child under 2 years old. Or a fever that lasts for 3 days in a child 2 years or older.      Date Last Reviewed: 6/2/2017 © 2000-2018 [employer]. [address] [place] [id number]. All rights reserved. This information is not intended as a substitute for professional medical care. Always follow your healthcare professional's instructions.            Recurrent major depressive disorder, remission status unspecified

## 2021-06-08 ENCOUNTER — OUTPATIENT (OUTPATIENT)
Dept: OUTPATIENT SERVICES | Facility: HOSPITAL | Age: 21
LOS: 1 days | Discharge: HOME | End: 2021-06-08
Payer: COMMERCIAL

## 2021-06-08 DIAGNOSIS — N64.4 MASTODYNIA: ICD-10-CM

## 2021-06-08 PROCEDURE — 76641 ULTRASOUND BREAST COMPLETE: CPT | Mod: 26,50

## 2022-06-15 NOTE — PATIENT PROFILE BEHAVIORAL HEALTH - FUNCTIONAL SCREEN CURRENT LEVEL: AMBULATION, MLM
Quality 130: Documentation Of Current Medications In The Medical Record: Current Medications Documented
Detail Level: Detailed
0 = independent

## 2022-09-15 NOTE — ED BEHAVIORAL HEALTH ASSESSMENT NOTE - [facility] ED [facility] REVIEW OF ED CHART AVAILABLE IMAGING REVIEWED
Diabetes:  Blood sugar goals:  Hemoglobin A1c under 7  Fasting blood sugar   Blood sugar 2 hours after a meal under 180, 4 hours after a meal under 120  No hypoglycemia (sugars under 70 and symptomatic low sugars)    Blood sugar control with diet and exercise:  Exercise 45 minutes per day. This makes your insulin work better. It also allows insulin levels to fall helping with weight loss. Every night, try to fast from your evening meal to breakfast (at least 12 hours) without eating anything. This uses stored energy in your liver and makes insulin work better. Avoid simples sugars such as table sugar in drinks (sodas, lemonade, sweet tea, wine), desserts, candy. Also avoid fruit juices and high fructose corn syrup. Avoid frequent consumption of fruit especially grapes and bananas. A single serving of fruit daily is all you should have. Finally, drink less milk which has milk sugar in it. Control your starch intake. Men should have 3-4 carb portions per meal.  Women should have 2-3 carb portions per meal.  A carb serving is the equivalent of a slice of bread. Control your blood pressure and cholesterol. These problems are common in diabetes. AND, don't smoke. All of these problems contribute to heart disease and stroke risk. Get a yearly eye exam and flu shot. Make sure your vaccines are up to date particularly the pneumonia vaccines. Inspect your feet daily. Wear comfortable protective shoes and clean socks. Seek medical care if there are sore, calluses or numbness and burning of your feet. See your doctor at least every 6 months if you are on oral medicines or more often if the diabetic control is not at goal or if you are on insulin. Take your medicines religiously. STOP the SUGAR    The first step in dietary efforts at weight loss is removing as much sugar from the diet as possible.   Most dietary sugar is in the forms of table sugar (sucrose), fruit sugar (fructose) and milk sugar (lactose). But, you need to watch labels to see if processed foods have added sugars under other names. To eliminate sucrose, eliminate sweet drinks entirely including soft drinks, sports drinks, lemonade, sweet tea and wine. Also, eliminate candy and make desserts a \"special occasion only treat\". Don't eat desserts with every meal or every night. Most fructose is found in fruit and this should be markedly limited. Fruit juice should be avoided. If you do eat fruit, eat fruits that are lower in sugar such as: strawberries, blackberries, raspberries, lemon, grapefruit and watermelon. Eat small portions and think of the fruit as a garnish or small treat. Bananas, mangos, cherries and grapes are higher in sugar and should be avoided. Avoid high fructose corn syrup (an artificial sweetener). Milk sugar, or lactose, should be avoided as well. Milk is a good source of calcium but not for people struggling with weight issues. Put a little cream in your coffee or tea but otherwise avoid milk. How can you avoid sugar? For starters, don't buy it and don't bring it in the house. It won't tempt you that way! For more information:    Try the internet for videos about sugar addiction or try diet [employer]. So, what can I eat? 1) Protein-protein consumption promotes weight loss. Eat protein at every meal.  Good sources of protein include meat, fish, poultry and eggs. 2) More soluble fiber-soluble fiber helps us feel \"full\" and helps improve many markers for cardiovascular disease. But, we have to watch out for products with added sugar or large carb servings! Sources of soluble fiber include oatmeal, root vegetables such as sweet potatoes, turnips and carrots, vegetables such as broccoli and Brussel sprouts. 3) Healthy fats and oils-certain fats are better for our blood vessels and cardiovascular system.   The oils in fish and seafood tend to be better for us as well as olive oil and the nuts on trees (walnuts, almonds, pistachios and hazelnuts). So, again, try to eat more fish. Use olive oil for cooking and for salad dressings. Nuts can be used in salads and in other dishes and they make a good low carb snack. 4) Non starchy vegetables-Green and yellow vegetables offer a lot of nutrients and very low amounts of carbs that can lead to weight gain. Salads can add a lot to our diet and also pack in a lot of nutrients. Cautions:  avoid starchy vegetables such as potatoes, corn and peas. Also, be careful about what is added to vegetables such as fruit or salad dressings that contain sugar. 5) Full fat dairy products-Cheeses make a good snack or appetizer. Cottage cheese can be a good basis for breakfast.  Yogurt is a good addition to our diet but watch out for yogurt that has added sugar. Avoid milk. Yes

## 2024-11-18 NOTE — PROGRESS NOTE BEHAVIORAL HEALTH - NSBHFUPSTRENGTHS_PSY_A_CORE
show
Has supportive interpersonal relationships with family, friends or peers/In good physical health
